# Patient Record
Sex: FEMALE | Race: WHITE | Employment: PART TIME | URBAN - METROPOLITAN AREA
[De-identification: names, ages, dates, MRNs, and addresses within clinical notes are randomized per-mention and may not be internally consistent; named-entity substitution may affect disease eponyms.]

---

## 2021-12-03 ENCOUNTER — TELEPHONE (OUTPATIENT)
Dept: ORTHOPEDIC SURGERY | Age: 51
End: 2021-12-03

## 2021-12-03 DIAGNOSIS — S52.532A CLOSED COLLES' FRACTURE OF LEFT RADIUS, INITIAL ENCOUNTER: Primary | ICD-10-CM

## 2021-12-03 NOTE — TELEPHONE ENCOUNTER
Spoke to Markus Wallace regarding her injury. In summary, 23-year-old right-hand-dominant female slipped on a wet embankment earlier this morning and landed onto an outstretched right hand. She sustained immediate deformity and inability to use her right wrist. She was seen at [facility]. I was contacted regarding her case. I recommended operative intervention given significant dorsal angulation and shortening of her radius. She was placed into a well-padded sugar tong splint. She denies dysesthesias. After discussion, the patient elected to proceed with operative intervention. She would prefer to have this performed closer to her home at [facility]. Thursday, December 9 was offered as a surgery date.

## 2021-12-07 ENCOUNTER — ANESTHESIA EVENT (OUTPATIENT)
Dept: OPERATING ROOM | Age: 51
End: 2021-12-07
Payer: COMMERCIAL

## 2021-12-07 RX ORDER — M-VIT,TX,IRON,MINS/CALC/FOLIC 27MG-0.4MG
1 TABLET ORAL DAILY
COMMUNITY

## 2021-12-07 RX ORDER — SODIUM CHLORIDE 0.9 % (FLUSH) 0.9 %
5-40 SYRINGE (ML) INJECTION EVERY 12 HOURS SCHEDULED
Status: CANCELLED | OUTPATIENT
Start: 2021-12-07

## 2021-12-07 RX ORDER — SODIUM CHLORIDE 9 MG/ML
25 INJECTION, SOLUTION INTRAVENOUS PRN
Status: CANCELLED | OUTPATIENT
Start: 2021-12-07

## 2021-12-07 RX ORDER — SODIUM CHLORIDE 0.9 % (FLUSH) 0.9 %
5-40 SYRINGE (ML) INJECTION PRN
Status: CANCELLED | OUTPATIENT
Start: 2021-12-07

## 2021-12-07 NOTE — PROGRESS NOTES

## 2021-12-08 ENCOUNTER — TELEPHONE (OUTPATIENT)
Dept: ORTHOPEDIC SURGERY | Age: 51
End: 2021-12-08

## 2021-12-08 NOTE — TELEPHONE ENCOUNTER
Called [facility] for pre cert for outpatient surgery scheduled tomorrow with Dr. Hiram Lopez at [facility].     Patient is have a right radius ORIF [id number]    No pre cert Required for this CPT.    Reference # [id number]

## 2021-12-09 ENCOUNTER — APPOINTMENT (OUTPATIENT)
Dept: GENERAL RADIOLOGY | Age: 51
End: 2021-12-09
Attending: ORTHOPAEDIC SURGERY
Payer: COMMERCIAL

## 2021-12-09 ENCOUNTER — ANESTHESIA (OUTPATIENT)
Dept: OPERATING ROOM | Age: 51
End: 2021-12-09
Payer: COMMERCIAL

## 2021-12-09 ENCOUNTER — HOSPITAL ENCOUNTER (OUTPATIENT)
Age: 51
Setting detail: OUTPATIENT SURGERY
Discharge: HOME | End: 2021-12-09
Attending: ORTHOPAEDIC SURGERY | Admitting: ORTHOPAEDIC SURGERY
Payer: COMMERCIAL

## 2021-12-09 VITALS
HEART RATE: 84 BPM | RESPIRATION RATE: 20 BRPM | SYSTOLIC BLOOD PRESSURE: 116 MMHG | BODY MASS INDEX: 25.49 KG/M2 | WEIGHT: 135 LBS | TEMPERATURE: 98 F | DIASTOLIC BLOOD PRESSURE: 89 MMHG | OXYGEN SATURATION: 96 % | HEIGHT: 61 IN

## 2021-12-09 VITALS — SYSTOLIC BLOOD PRESSURE: 100 MMHG | TEMPERATURE: 96.4 F | OXYGEN SATURATION: 100 % | DIASTOLIC BLOOD PRESSURE: 51 MMHG

## 2021-12-09 DIAGNOSIS — S52.532A CLOSED COLLES' FRACTURE OF LEFT RADIUS, INITIAL ENCOUNTER: Primary | ICD-10-CM

## 2021-12-09 LAB
ANION GAP SERPL CALCULATED.3IONS-SCNC: 12 MMOL/L (ref 3–16)
BUN BLDV-MCNC: 10 MG/DL (ref 7–20)
CALCIUM SERPL-MCNC: 9.3 MG/DL (ref 8.3–10.6)
CHLORIDE BLD-SCNC: 99 MMOL/L (ref 99–110)
CO2: 22 MMOL/L (ref 21–32)
CREAT SERPL-MCNC: <0.5 MG/DL (ref 0.6–1.1)
EKG ATRIAL RATE: 75 BPM
EKG DIAGNOSIS: NORMAL
EKG P AXIS: 60 DEGREES
EKG P-R INTERVAL: 140 MS
EKG Q-T INTERVAL: 386 MS
EKG QRS DURATION: 74 MS
EKG QTC CALCULATION (BAZETT): 431 MS
EKG R AXIS: 61 DEGREES
EKG T AXIS: 36 DEGREES
EKG VENTRICULAR RATE: 75 BPM
GFR AFRICAN AMERICAN: >60
GFR NON-AFRICAN AMERICAN: >60
GLUCOSE BLD-MCNC: 115 MG/DL (ref 70–99)
HCT VFR BLD CALC: 39.9 % (ref 36–48)
HEMOGLOBIN: 13.5 G/DL (ref 12–16)
INR BLD: 0.87 (ref 0.88–1.12)
MCH RBC QN AUTO: 31.6 PG (ref 26–34)
MCHC RBC AUTO-ENTMCNC: 33.7 G/DL (ref 31–36)
MCV RBC AUTO: 93.6 FL (ref 80–100)
PDW BLD-RTO: 12.8 % (ref 12.4–15.4)
PLATELET # BLD: 318 K/UL (ref 135–450)
PMV BLD AUTO: 8.5 FL (ref 5–10.5)
POTASSIUM REFLEX MAGNESIUM: 4.5 MMOL/L (ref 3.5–5.1)
PROTHROMBIN TIME: 9.8 SEC (ref 9.9–12.7)
RBC # BLD: 4.27 M/UL (ref 4–5.2)
SODIUM BLD-SCNC: 133 MMOL/L (ref 136–145)
WBC # BLD: 7.3 K/UL (ref 4–11)

## 2021-12-09 PROCEDURE — 85610 PROTHROMBIN TIME: CPT

## 2021-12-09 PROCEDURE — 7100000000 HC PACU RECOVERY - FIRST 15 MIN: Performed by: ORTHOPAEDIC SURGERY

## 2021-12-09 PROCEDURE — 3700000001 HC ADD 15 MINUTES (ANESTHESIA): Performed by: ORTHOPAEDIC SURGERY

## 2021-12-09 PROCEDURE — 6360000002 HC RX W HCPCS: Performed by: ANESTHESIOLOGY

## 2021-12-09 PROCEDURE — 64415 NJX AA&/STRD BRCH PLXS IMG: CPT | Performed by: ANESTHESIOLOGY

## 2021-12-09 PROCEDURE — 36415 COLL VENOUS BLD VENIPUNCTURE: CPT

## 2021-12-09 PROCEDURE — 93005 ELECTROCARDIOGRAM TRACING: CPT | Performed by: ORTHOPAEDIC SURGERY

## 2021-12-09 PROCEDURE — 2709999900 HC NON-CHARGEABLE SUPPLY: Performed by: ORTHOPAEDIC SURGERY

## 2021-12-09 PROCEDURE — 3700000000 HC ANESTHESIA ATTENDED CARE: Performed by: ORTHOPAEDIC SURGERY

## 2021-12-09 PROCEDURE — 3600000014 HC SURGERY LEVEL 4 ADDTL 15MIN: Performed by: ORTHOPAEDIC SURGERY

## 2021-12-09 PROCEDURE — 93010 ELECTROCARDIOGRAM REPORT: CPT | Performed by: INTERNAL MEDICINE

## 2021-12-09 PROCEDURE — 85027 COMPLETE CBC AUTOMATED: CPT

## 2021-12-09 PROCEDURE — 25608 OPTX DST RD XART FX/EPI SEP2: CPT | Performed by: ORTHOPAEDIC SURGERY

## 2021-12-09 PROCEDURE — 2720000010 HC SURG SUPPLY STERILE: Performed by: ORTHOPAEDIC SURGERY

## 2021-12-09 PROCEDURE — 2580000003 HC RX 258: Performed by: ORTHOPAEDIC SURGERY

## 2021-12-09 PROCEDURE — 2580000003 HC RX 258: Performed by: NURSE ANESTHETIST, CERTIFIED REGISTERED

## 2021-12-09 PROCEDURE — 7100000011 HC PHASE II RECOVERY - ADDTL 15 MIN: Performed by: ORTHOPAEDIC SURGERY

## 2021-12-09 PROCEDURE — C1713 ANCHOR/SCREW BN/BN,TIS/BN: HCPCS | Performed by: ORTHOPAEDIC SURGERY

## 2021-12-09 PROCEDURE — 6360000002 HC RX W HCPCS: Performed by: ORTHOPAEDIC SURGERY

## 2021-12-09 PROCEDURE — 7100000010 HC PHASE II RECOVERY - FIRST 15 MIN: Performed by: ORTHOPAEDIC SURGERY

## 2021-12-09 PROCEDURE — 80048 BASIC METABOLIC PNL TOTAL CA: CPT

## 2021-12-09 PROCEDURE — 3600000004 HC SURGERY LEVEL 4 BASE: Performed by: ORTHOPAEDIC SURGERY

## 2021-12-09 PROCEDURE — 6360000002 HC RX W HCPCS: Performed by: NURSE ANESTHETIST, CERTIFIED REGISTERED

## 2021-12-09 PROCEDURE — 2580000003 HC RX 258: Performed by: ANESTHESIOLOGY

## 2021-12-09 PROCEDURE — 3209999900 FLUORO FOR SURGICAL PROCEDURES

## 2021-12-09 PROCEDURE — 7100000001 HC PACU RECOVERY - ADDTL 15 MIN: Performed by: ORTHOPAEDIC SURGERY

## 2021-12-09 PROCEDURE — 2500000003 HC RX 250 WO HCPCS: Performed by: NURSE ANESTHETIST, CERTIFIED REGISTERED

## 2021-12-09 PROCEDURE — 2500000003 HC RX 250 WO HCPCS: Performed by: ANESTHESIOLOGY

## 2021-12-09 DEVICE — VLP TITANIUM 2.4MM X 16MM LOCKING                                    SCREW T7 SELF-TAPPING
Type: IMPLANTABLE DEVICE | Site: RADIUS | Status: FUNCTIONAL
Brand: VLP MINI-MOD

## 2021-12-09 DEVICE — VLP TITANIUM 2.4MM X 20MM LOCKING                                    SCREW T7 SELF-TAPPING
Type: IMPLANTABLE DEVICE | Site: RADIUS | Status: FUNCTIONAL
Brand: VLP MINI-MOD

## 2021-12-09 DEVICE — VLP TITANIUM 2.4MM X 13MM CORTEX                                    SCREW T7 SELF-TAPPING
Type: IMPLANTABLE DEVICE | Site: RADIUS | Status: FUNCTIONAL
Brand: VLP MINI-MOD

## 2021-12-09 DEVICE — VLP TITANIUM 2.4MM X 12MM CORTEX                                    SCREW T7 SELF-TAPPING
Type: IMPLANTABLE DEVICE | Site: RADIUS | Status: FUNCTIONAL
Brand: VLP MINI-MOD

## 2021-12-09 DEVICE — VLP TITANIUM 2.4MM X 21MM LOCKING                                    SCREW T7 SELF-TAPPING
Type: IMPLANTABLE DEVICE | Site: RADIUS | Status: FUNCTIONAL
Brand: VLP MINI-MOD

## 2021-12-09 RX ORDER — BUPIVACAINE HYDROCHLORIDE 5 MG/ML
INJECTION, SOLUTION EPIDURAL; INTRACAUDAL PRN
Status: DISCONTINUED | OUTPATIENT
Start: 2021-12-09 | End: 2021-12-09 | Stop reason: SDUPTHER

## 2021-12-09 RX ORDER — LABETALOL HYDROCHLORIDE 5 MG/ML
5 INJECTION, SOLUTION INTRAVENOUS EVERY 10 MIN PRN
Status: DISCONTINUED | OUTPATIENT
Start: 2021-12-09 | End: 2021-12-09 | Stop reason: HOSPADM

## 2021-12-09 RX ORDER — MAGNESIUM HYDROXIDE 1200 MG/15ML
LIQUID ORAL CONTINUOUS PRN
Status: COMPLETED | OUTPATIENT
Start: 2021-12-09 | End: 2021-12-09

## 2021-12-09 RX ORDER — HYDRALAZINE HYDROCHLORIDE 20 MG/ML
5 INJECTION INTRAMUSCULAR; INTRAVENOUS EVERY 10 MIN PRN
Status: DISCONTINUED | OUTPATIENT
Start: 2021-12-09 | End: 2021-12-09 | Stop reason: HOSPADM

## 2021-12-09 RX ORDER — OXYCODONE HYDROCHLORIDE AND ACETAMINOPHEN 5; 325 MG/1; MG/1
1 TABLET ORAL PRN
Status: DISCONTINUED | OUTPATIENT
Start: 2021-12-09 | End: 2021-12-09 | Stop reason: HOSPADM

## 2021-12-09 RX ORDER — MORPHINE SULFATE 2 MG/ML
1 INJECTION, SOLUTION INTRAMUSCULAR; INTRAVENOUS EVERY 5 MIN PRN
Status: DISCONTINUED | OUTPATIENT
Start: 2021-12-09 | End: 2021-12-09 | Stop reason: HOSPADM

## 2021-12-09 RX ORDER — SODIUM CHLORIDE 0.9 % (FLUSH) 0.9 %
10 SYRINGE (ML) INJECTION PRN
Status: DISCONTINUED | OUTPATIENT
Start: 2021-12-09 | End: 2021-12-09 | Stop reason: HOSPADM

## 2021-12-09 RX ORDER — FENTANYL CITRATE 50 UG/ML
INJECTION, SOLUTION INTRAMUSCULAR; INTRAVENOUS PRN
Status: DISCONTINUED | OUTPATIENT
Start: 2021-12-09 | End: 2021-12-09 | Stop reason: SDUPTHER

## 2021-12-09 RX ORDER — APREPITANT 40 MG/1
40 CAPSULE ORAL ONCE
Status: COMPLETED | OUTPATIENT
Start: 2021-12-09 | End: 2021-12-09

## 2021-12-09 RX ORDER — PROMETHAZINE HYDROCHLORIDE 25 MG/ML
6.25 INJECTION, SOLUTION INTRAMUSCULAR; INTRAVENOUS
Status: DISCONTINUED | OUTPATIENT
Start: 2021-12-09 | End: 2021-12-09 | Stop reason: HOSPADM

## 2021-12-09 RX ORDER — MEPERIDINE HYDROCHLORIDE 25 MG/ML
12.5 INJECTION INTRAMUSCULAR; INTRAVENOUS; SUBCUTANEOUS EVERY 5 MIN PRN
Status: DISCONTINUED | OUTPATIENT
Start: 2021-12-09 | End: 2021-12-09 | Stop reason: HOSPADM

## 2021-12-09 RX ORDER — POLYETHYLENE GLYCOL 3350 17 G/17G
17 POWDER, FOR SOLUTION ORAL DAILY PRN
Status: DISCONTINUED | OUTPATIENT
Start: 2021-12-09 | End: 2021-12-09 | Stop reason: HOSPADM

## 2021-12-09 RX ORDER — ONDANSETRON 4 MG/1
4 TABLET, ORALLY DISINTEGRATING ORAL EVERY 8 HOURS PRN
Status: DISCONTINUED | OUTPATIENT
Start: 2021-12-09 | End: 2021-12-09 | Stop reason: HOSPADM

## 2021-12-09 RX ORDER — LIDOCAINE HYDROCHLORIDE 10 MG/ML
1 INJECTION, SOLUTION EPIDURAL; INFILTRATION; INTRACAUDAL; PERINEURAL
Status: DISCONTINUED | OUTPATIENT
Start: 2021-12-09 | End: 2021-12-09 | Stop reason: HOSPADM

## 2021-12-09 RX ORDER — ONDANSETRON 2 MG/ML
4 INJECTION INTRAMUSCULAR; INTRAVENOUS
Status: DISCONTINUED | OUTPATIENT
Start: 2021-12-09 | End: 2021-12-09 | Stop reason: HOSPADM

## 2021-12-09 RX ORDER — MIDAZOLAM HYDROCHLORIDE 1 MG/ML
INJECTION INTRAMUSCULAR; INTRAVENOUS PRN
Status: DISCONTINUED | OUTPATIENT
Start: 2021-12-09 | End: 2021-12-09 | Stop reason: SDUPTHER

## 2021-12-09 RX ORDER — PROPOFOL 10 MG/ML
INJECTION, EMULSION INTRAVENOUS PRN
Status: DISCONTINUED | OUTPATIENT
Start: 2021-12-09 | End: 2021-12-09 | Stop reason: SDUPTHER

## 2021-12-09 RX ORDER — ONDANSETRON 2 MG/ML
4 INJECTION INTRAMUSCULAR; INTRAVENOUS EVERY 6 HOURS PRN
Status: DISCONTINUED | OUTPATIENT
Start: 2021-12-09 | End: 2021-12-09 | Stop reason: HOSPADM

## 2021-12-09 RX ORDER — MIDAZOLAM HYDROCHLORIDE 1 MG/ML
INJECTION INTRAMUSCULAR; INTRAVENOUS
Status: COMPLETED
Start: 2021-12-09 | End: 2021-12-09

## 2021-12-09 RX ORDER — ONDANSETRON 2 MG/ML
INJECTION INTRAMUSCULAR; INTRAVENOUS PRN
Status: DISCONTINUED | OUTPATIENT
Start: 2021-12-09 | End: 2021-12-09 | Stop reason: SDUPTHER

## 2021-12-09 RX ORDER — SODIUM CHLORIDE 9 MG/ML
25 INJECTION, SOLUTION INTRAVENOUS PRN
Status: DISCONTINUED | OUTPATIENT
Start: 2021-12-09 | End: 2021-12-09 | Stop reason: HOSPADM

## 2021-12-09 RX ORDER — SODIUM CHLORIDE, SODIUM LACTATE, POTASSIUM CHLORIDE, CALCIUM CHLORIDE 600; 310; 30; 20 MG/100ML; MG/100ML; MG/100ML; MG/100ML
INJECTION, SOLUTION INTRAVENOUS CONTINUOUS PRN
Status: DISCONTINUED | OUTPATIENT
Start: 2021-12-09 | End: 2021-12-09 | Stop reason: SDUPTHER

## 2021-12-09 RX ORDER — OXYCODONE HYDROCHLORIDE AND ACETAMINOPHEN 5; 325 MG/1; MG/1
1 TABLET ORAL EVERY 4 HOURS PRN
Qty: 30 TABLET | Refills: 0 | Status: SHIPPED | OUTPATIENT
Start: 2021-12-09 | End: 2021-12-14

## 2021-12-09 RX ORDER — SODIUM CHLORIDE 0.9 % (FLUSH) 0.9 %
10 SYRINGE (ML) INJECTION EVERY 12 HOURS SCHEDULED
Status: DISCONTINUED | OUTPATIENT
Start: 2021-12-09 | End: 2021-12-09 | Stop reason: HOSPADM

## 2021-12-09 RX ORDER — MORPHINE SULFATE 2 MG/ML
2 INJECTION, SOLUTION INTRAMUSCULAR; INTRAVENOUS EVERY 5 MIN PRN
Status: DISCONTINUED | OUTPATIENT
Start: 2021-12-09 | End: 2021-12-09 | Stop reason: HOSPADM

## 2021-12-09 RX ORDER — DIPHENHYDRAMINE HYDROCHLORIDE 50 MG/ML
12.5 INJECTION INTRAMUSCULAR; INTRAVENOUS
Status: DISCONTINUED | OUTPATIENT
Start: 2021-12-09 | End: 2021-12-09 | Stop reason: HOSPADM

## 2021-12-09 RX ORDER — SODIUM CHLORIDE, SODIUM LACTATE, POTASSIUM CHLORIDE, CALCIUM CHLORIDE 600; 310; 30; 20 MG/100ML; MG/100ML; MG/100ML; MG/100ML
INJECTION, SOLUTION INTRAVENOUS CONTINUOUS
Status: DISCONTINUED | OUTPATIENT
Start: 2021-12-09 | End: 2021-12-09 | Stop reason: HOSPADM

## 2021-12-09 RX ORDER — LIDOCAINE HYDROCHLORIDE 20 MG/ML
INJECTION, SOLUTION INFILTRATION; PERINEURAL PRN
Status: DISCONTINUED | OUTPATIENT
Start: 2021-12-09 | End: 2021-12-09 | Stop reason: SDUPTHER

## 2021-12-09 RX ORDER — OXYCODONE HYDROCHLORIDE AND ACETAMINOPHEN 5; 325 MG/1; MG/1
2 TABLET ORAL PRN
Status: DISCONTINUED | OUTPATIENT
Start: 2021-12-09 | End: 2021-12-09 | Stop reason: HOSPADM

## 2021-12-09 RX ADMIN — SODIUM CHLORIDE, POTASSIUM CHLORIDE, SODIUM LACTATE AND CALCIUM CHLORIDE: 600; 310; 30; 20 INJECTION, SOLUTION INTRAVENOUS at 10:33

## 2021-12-09 RX ADMIN — BUPIVACAINE HYDROCHLORIDE 30 ML: 5 INJECTION, SOLUTION EPIDURAL; INTRACAUDAL; PERINEURAL at 09:44

## 2021-12-09 RX ADMIN — Medication 2000 MG: at 10:30

## 2021-12-09 RX ADMIN — ONDANSETRON HYDROCHLORIDE 4 MG: 2 INJECTION, SOLUTION INTRAMUSCULAR; INTRAVENOUS at 10:36

## 2021-12-09 RX ADMIN — MIDAZOLAM 4 MG: 1 INJECTION INTRAMUSCULAR; INTRAVENOUS at 09:44

## 2021-12-09 RX ADMIN — FENTANYL CITRATE 50 MCG: 50 INJECTION INTRAMUSCULAR; INTRAVENOUS at 10:52

## 2021-12-09 RX ADMIN — PROPOFOL 200 MG: 10 INJECTION, EMULSION INTRAVENOUS at 10:36

## 2021-12-09 RX ADMIN — LIDOCAINE HYDROCHLORIDE 100 MG: 20 INJECTION, SOLUTION INFILTRATION; PERINEURAL at 10:36

## 2021-12-09 RX ADMIN — APREPITANT 40 MG: 40 CAPSULE ORAL at 10:06

## 2021-12-09 RX ADMIN — SODIUM CHLORIDE, POTASSIUM CHLORIDE, SODIUM LACTATE AND CALCIUM CHLORIDE: 600; 310; 30; 20 INJECTION, SOLUTION INTRAVENOUS at 08:36

## 2021-12-09 RX ADMIN — FENTANYL CITRATE 50 MCG: 50 INJECTION INTRAMUSCULAR; INTRAVENOUS at 10:36

## 2021-12-09 ASSESSMENT — PULMONARY FUNCTION TESTS
PIF_VALUE: 3
PIF_VALUE: 2
PIF_VALUE: 2
PIF_VALUE: 3
PIF_VALUE: 3
PIF_VALUE: 2
PIF_VALUE: 3
PIF_VALUE: 3
PIF_VALUE: 2
PIF_VALUE: 2
PIF_VALUE: 3
PIF_VALUE: 3
PIF_VALUE: 2
PIF_VALUE: 2
PIF_VALUE: 3
PIF_VALUE: 2
PIF_VALUE: 3
PIF_VALUE: 2
PIF_VALUE: 0
PIF_VALUE: 2
PIF_VALUE: 3
PIF_VALUE: 1
PIF_VALUE: 2
PIF_VALUE: 4
PIF_VALUE: 2
PIF_VALUE: 3
PIF_VALUE: 2
PIF_VALUE: 3
PIF_VALUE: 2
PIF_VALUE: 1
PIF_VALUE: 3
PIF_VALUE: 2
PIF_VALUE: 3
PIF_VALUE: 2
PIF_VALUE: 2
PIF_VALUE: 3
PIF_VALUE: 2
PIF_VALUE: 3
PIF_VALUE: 4
PIF_VALUE: 3
PIF_VALUE: 3
PIF_VALUE: 4
PIF_VALUE: 3
PIF_VALUE: 2
PIF_VALUE: 3
PIF_VALUE: 3
PIF_VALUE: 2
PIF_VALUE: 3
PIF_VALUE: 2
PIF_VALUE: 3
PIF_VALUE: 2
PIF_VALUE: 2
PIF_VALUE: 3
PIF_VALUE: 3
PIF_VALUE: 2
PIF_VALUE: 3
PIF_VALUE: 2
PIF_VALUE: 3
PIF_VALUE: 2
PIF_VALUE: 3
PIF_VALUE: 3
PIF_VALUE: 2
PIF_VALUE: 2
PIF_VALUE: 3
PIF_VALUE: 3
PIF_VALUE: 2
PIF_VALUE: 3
PIF_VALUE: 3
PIF_VALUE: 2
PIF_VALUE: 3
PIF_VALUE: 3
PIF_VALUE: 2
PIF_VALUE: 2
PIF_VALUE: 0
PIF_VALUE: 3
PIF_VALUE: 3
PIF_VALUE: 2
PIF_VALUE: 3
PIF_VALUE: 4
PIF_VALUE: 2

## 2021-12-09 ASSESSMENT — PAIN - FUNCTIONAL ASSESSMENT: PAIN_FUNCTIONAL_ASSESSMENT: 0-10

## 2021-12-09 ASSESSMENT — LIFESTYLE VARIABLES: SMOKING_STATUS: 1

## 2021-12-09 NOTE — ANESTHESIA PRE PROCEDURE
Department of Anesthesiology  Preprocedure Note       Name:  Oscar Govea   Age:  46 y.o.  :  1970                                          MRN:  [id number]         Date:  2021      Surgeon: Jessie Mohs):  Fredi Arechiga MD    Procedure: Procedure(s):  RIGHT RADIUS OPEN REDUCTION INTERNAL FIXATION *WITH BLOCK*    Medications prior to admission:   Prior to Admission medications    Medication Sig Start Date End Date Taking?  Authorizing Provider   Multiple Vitamins-Minerals (THERAPEUTIC MULTIVITAMIN-MINERALS) tablet Take 1 tablet by mouth daily   Yes Historical Provider, MD   aspirin 81 MG tablet Take 81 mg by mouth daily   Yes Historical Provider, MD       Current medications:    Current Facility-Administered Medications   Medication Dose Route Frequency Provider Last Rate Last Admin    ondansetron (ZOFRAN-ODT) disintegrating tablet 4 mg  4 mg Oral Q8H PRN Fredi Arechiga MD        Or    ondansetron [facility]) injection 4 mg  4 mg IntraVENous Q6H PRN Fredi Arechiga MD        polyethylene glycol [facility]) packet 17 g  17 g Oral Daily PRN Fredi Arechiga MD        ceFAZolin (ANCEF) 2000 mg in sterile water 20 mL IV syringe  2,000 mg IntraVENous On Call to [address] [place] [address] MD        lactated ringers infusion   IntraVENous Continuous Cassie Dhillon  mL/hr at 21 0836 New Bag at 21 0836    sodium chloride flush 0.9 % injection 10 mL  10 mL IntraVENous 2 times per day Cassie Dhillon MD        sodium chloride flush 0.9 % injection 10 mL  10 mL IntraVENous PRN Cassie Dhillon MD        0.9 % sodium chloride infusion  25 mL IntraVENous PRN Cassie Dhillon MD        lidocaine PF 1 % injection 1 mL  1 mL IntraDERmal Once PRN Cassie Dhillon MD        aprepitant (EMEND) capsule 40 mg  40 mg Oral Once Cassie Dhillon MD           Allergies:  No Known Allergies    Problem List:    Patient Active Problem List   Diagnosis Code    Nondisplaced fracture of anterior process of calcaneus S92.026A    Fracture, Colles, left, closed S52.532A       Past Medical History:        Diagnosis Date    Fractures        Past Surgical History:  History reviewed. No pertinent surgical history. Social History:    Social History     Tobacco Use    Smoking status: Current Some Day Smoker     Types: Cigarettes    Smokeless tobacco: Never Used   Substance Use Topics    Alcohol use: Yes     Alcohol/week: 0.0 standard drinks                                Ready to quit: Not Answered  Counseling given: Not Answered      Vital Signs (Current):   Vitals:    12/07/21 1052 12/09/21 0813   BP:  (!) 148/80   Pulse:  78   Resp:  16   Temp:  97.7 °F (36.5 °C)   TempSrc:  Infrared   SpO2:  97%   Weight: 135 lb (61.2 kg) 135 lb (61.2 kg)   Height: 5' 1\" (1.549 m) 5' 1\" (1.549 m)                                              BP Readings from Last 3 Encounters:   12/09/21 (!) 148/80   07/06/15 140/90       NPO Status: Time of last liquid consumption: 0000                        Time of last solid consumption: 0000                        Date of last liquid consumption: 12/08/21                        Date of last solid food consumption: 12/08/21    BMI:   Wt Readings from Last 3 Encounters:   12/09/21 135 lb (61.2 kg)   07/06/15 130 lb (59 kg)   06/05/15 135 lb (61.2 kg)     Body mass index is 25.51 kg/m². CBC: No results found for: WBC, RBC, HGB, HCT, MCV, RDW, PLT    CMP: No results found for: NA, K, CL, CO2, BUN, CREATININE, GFRAA, AGRATIO, LABGLOM, GLUCOSE, PROT, CALCIUM, BILITOT, ALKPHOS, AST, ALT    POC Tests: No results for input(s): POCGLU, POCNA, POCK, POCCL, POCBUN, POCHEMO, POCHCT in the last 72 hours.     Coags: No results found for: PROTIME, INR, APTT    HCG (If Applicable): No results found for: PREGTESTUR, PREGSERUM, HCG, HCGQUANT     ABGs: No results found for: PHART, PO2ART, YAR6JVP, WLH1QZC, BEART, E9VKITWO     Type & Screen (If Applicable):  No results found for: LABABO, LABRH    Drug/Infectious Status (If Applicable):  No results found for: HIV, HEPCAB    COVID-19 Screening (If Applicable): No results found for: COVID19        Anesthesia Evaluation   no history of anesthetic complications:   Airway: Mallampati: II  TM distance: >3 FB   Neck ROM: full  Mouth opening: > = 3 FB Dental:          Pulmonary:   (+) current smoker                           Cardiovascular:Negative CV ROS                      Neuro/Psych:   Negative Neuro/Psych ROS              GI/Hepatic/Renal: Neg GI/Hepatic/Renal ROS            Endo/Other: Negative Endo/Other ROS                    Abdominal:             Vascular: negative vascular ROS. Other Findings:             Anesthesia Plan      general     ASA 2     (Pt agrees to risks,benefits and alternatives to GA as well as an ultrasound guided supraclavicular nerve block for post operative analgesia.)  Induction: intravenous. Anesthetic plan and risks discussed with patient.                       Colette Mitchell MD   12/9/2021

## 2021-12-09 NOTE — OP NOTE
Operative Note      Patient: Delmy Corey  YOB: 1970  MRN: [id number]    Date of Procedure: 12/9/2021    Pre-Op Diagnosis: right displaced distal radius fracture    Post-Op Diagnosis: Same       Procedure(s):  RIGHT RADIUS OPEN REDUCTION INTERNAL FIXATION    Surgeon(s):  Javier Muniz MD    Assistant:   Surgical Assistant: Duane Bow    Anesthesia: General    Estimated Blood Loss (mL): less than 50     Complications: None    Specimens:   * No specimens in log *    Implants:  Implant Name Type Inv. Item Serial No.  Lot No. LRB No. Used Action   SCREW BONE L12MM OD2. 4MM T7 CRTX ST TI VLP  SCREW BONE L12MM OD2. 4MM T7 CRTX ST TI VLP  [employer] [facility]  Right 2 Implanted   SCREW BONE L13MM OD2. 4MM T7 CRTX ST TI VLP  SCREW BONE L13MM OD2. 4MM T7 CRTX ST TI VLP  [employer] [facility]  Right 1 Implanted   SCREW BONE L16MM OD2. 4MM T7 LCK ST TI VLP  SCREW BONE L16MM OD2. 4MM T7 LCK ST TI VLP  [employer] [facility]  Right 1 Implanted   SCREW BONE L20MM DIA2.4MM WR TI ST LCK T7 FOR VLP PLT  SCREW BONE L20MM DIA2.4MM WR TI ST LCK T7 FOR VLP PLT  [employer] [facility]  Right 2 Implanted   SCREW BONE L21MM OD2. 4MM T7 LCK ST TI VLP  SCREW BONE L21MM OD2. 4MM T7 LCK ST TI VLP  [employer] [facility]  Right 2 Implanted   SCREW BONE L22MM DIA2.4MM WR TI ST LCK T7 FOR VLP PLT  SCREW BONE L22MM DIA2.4MM WR TI ST LCK T7 FOR VLP PLT  [employer] [facility]  Right 2 Implanted   Tourniquet time: 1 hour @250mmHg      Drains: * No LDAs found *    Findings: displaced distal radius fracture with intraarticular extension/separate styloid fragment. Detailed Description of Procedure:   Patient was positively identified in the preoperative holding area by the attending surgeon. Informed consent was verified and placed on the chart. The right upper extremity was marked appropriately.   The patient is then taken to the operating room by the anesthesia team.  An upper extremity block was performed for postoperative pain control. A general anesthesia was induced. She was laid supine with all bony prominences well-padded. A nonsterile tourniquet was placed high on the right upper extremity but not yet inflated. The limb was examined. There is diffuse ecchymosis about the volar wrist.  No cuts, wounds, or abrasions. There was obvious deformity with extension of the wrist.  At this point, the right upper extremity was prepped and draped in standard sterile fashion. A timeout was held to verify the correct patient, operative site, laterality, and procedure. Everyone in the room was in agreement. Next, the limb was exsanguinated via an Esmarch and the tourniquet was inflated. A standard FCR approach to the distal radius was utilized. A longitudinal incision was made over the palpable FCR tendon. Dissection carried sharply through the skin and subcutaneous tissues. A scalpel was used to incise the tendon sheath overlying the FCR tendon and this was retracted ulnarly to protect the median nerve. Surface of the flexor tendon sheath was incised. The FPL muscle belly was identified. This was also retracted ulnarly. The radial border of the pronator quadratus was incised sharply. An elevator was used to sweep the pronator ulnarly. The fracture site was identified. There is noted to be separate radial styloid piece. There is also noted to the a very distal fracture line. Fracture site was accentuated and a freer elevator was placed into the fracture site. The provisional reduction was achieved. At this point, a heavy K wire was placed across the fracture site from the radial styloid. C-arm fluoroscopy was brought into evaluate this in AP and lateral projections. Overall reasonable reduction achieved, near anatomic. At this point, a 3-hole distal radius locking plate was selected reconstructed. The standard width plate was selected.   This was then provisionally pinned in place using K wires. Attempt was made to keep this of low prominence at the watershed line/volar rim. A bicortical screw was placed in the oblong hole of the plate. Next, the fixed angle guide was used to place screws into the distal radius fragment. These were noted to be fairly horizontal and did not have to come to the articular surface as it was hoped. The screws were backed out and repositioned using the variable angle provided in the more distal trajectory. These were drilled on the lateral view with fluoroscopy. Being satisfied with this, additional screws were placed into the radial styloid and the lunate facet fragment. 2 additional bicortical screws were placed in the radial shaft to complete the fixation. C-arm fluoroscopy evaluated the reduction, hardware placement on AP, lateral, and 30 degree inclined x-ray. Satisfied with this, the wound was copiously irrigated with sterile saline solution. The tourniquet was let down. Hemostasis was achieved. The wound was then closed in layered fashion using 3-0 Vicryl and 3-0 nylon in an interrupted fashion. A sterile dressing was applied. A short arm volar splint was applied. The patient was then awakened from general anesthesia and transitioned back to the hospital bed. She was taken to the postoperative recovery area in apparent stable condition. There were no immediate complications. All sponge and needle counts were correct.     Electronically signed by Margretta Fabry, MD on 12/9/2021 at 12:52 PM

## 2021-12-09 NOTE — INTERVAL H&P NOTE
Update History & Physical    The patient's History and Physical of December 8, 2021 was reviewed with the patient and I examined the patient. There was no change. The surgical site was confirmed by the patient and me. Plan: The risks, benefits, expected outcome, and alternative to the recommended procedure have been discussed with the patient. Patient understands and wants to proceed with the procedure. Plan ORIF R distal radius today as outpatient.     Electronically signed by Glenda Juárez MD on 12/9/2021 at 12:31 PM

## 2021-12-09 NOTE — ANESTHESIA PROCEDURE NOTES
Peripheral Block    Patient location during procedure: pre-op  Staffing  Performed: anesthesiologist   Anesthesiologist: Daniel Mann MD  Preanesthetic Checklist  Completed: patient identified, IV checked, site marked, risks and benefits discussed, surgical consent, monitors and equipment checked, pre-op evaluation, timeout performed, anesthesia consent given, oxygen available and patient being monitored  Peripheral Block  Patient position: supine  Prep: ChloraPrep  Patient monitoring: continuous pulse ox and IV access  Block type: Brachial plexus  Laterality: right  Injection technique: single-shot  Guidance: ultrasound guided  Local infiltration: lidocaine  Infiltration strength: 1 %  Dose: 3 mL  Supraclavicular  Provider prep: mask and sterile gloves  Local infiltration: lidocaine  Needle  Needle type: combined needle/nerve stimulator   Needle gauge: 22 G  Needle length: 5 cm  Needle localization: ultrasound guidance  Assessment  Injection assessment: negative aspiration for heme, no paresthesia on injection and local visualized surrounding nerve on ultrasound  Paresthesia pain: none  Slow fractionated injection: yes  Hemodynamics: stable  Additional Notes  Immediately prior to procedure a \"time 2ut\" was called to verify the correct patient, allergies, laterality, procedure and equipment. Time out performed with Refugio Palomino RN    Local Anesthetic: 0.5 %  Bupivacaine plus epi 1 to 200K  Amount: 30 ml  in 5 ml increments after negative aspiration each time. Anterior scalene and middle scalene muscles, 1st rib and pleura, brachial plexus (upper, middle and lower trunk) and Subclavian artery are identified, the tip of the needle and the spread of the local anesthetic around the brachial plexus are visualized. The Brachial plexus appeared to be anatomically normal and there were no abnormal pathologically findings seen.          Reason for block: post-op pain management and at surgeon's request

## 2021-12-09 NOTE — ANESTHESIA POSTPROCEDURE EVALUATION
Department of Anesthesiology  Postprocedure Note    Patient: Delmy Corey  MRN: [id number]  YOB: 1970  Date of evaluation: 12/9/2021  Time:  3:12 PM     Procedure Summary     Date: 12/09/21 Room / Location: [address]    Anesthesia Start: 1033 Anesthesia Stop: 1219    Procedure: RIGHT RADIUS OPEN REDUCTION INTERNAL FIXATION (Right Arm Lower) Diagnosis:       Closed Colles' fracture of left radius, initial encounter      (right displaced distal radius fracture)    Surgeons: Javier Muniz MD Responsible Provider: Rama Chu MD    Anesthesia Type: general ASA Status: 2          Anesthesia Type: general    Letty Phase I: Letty Score: 10    Letty Phase II: Letty Score: 10    Last vitals: Reviewed and per EMR flowsheets. Anesthesia Post Evaluation    Patient location during evaluation: PACU  Patient participation: complete - patient participated  Level of consciousness: awake and alert  Airway patency: patent  Nausea & Vomiting: no nausea and no vomiting  Complications: no  Cardiovascular status: blood pressure returned to baseline  Respiratory status: acceptable  Hydration status: euvolemic  Comments: VSS on transfer to phase 2 recovery. No anesthetic complications.

## 2021-12-09 NOTE — H&P
ORTHOPAEDIC SURGERY HISTORY AND PHYSICAL    CHIEF COMPLAINT: Right wrist injury    HISTORY OF PRESENT ILLNESS:  57-year-old right-hand-dominant female presents for evaluation 6 days after her injury. She slipped on a wet embankment on 12/3/2021. She had immediate pain and deformity to her wrist.  She presented to [facility]. She was placed into a well-padded splint. She has not had any dysesthesias. Per report, no open wounds. Her pain has improved slightly from the time of her injury. She has been elevating and icing it. 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 Past medical history: None    Allergies: No known drug allergies    Past surgical history: None    Family history: No known bleeding or clotting disorders    Social history: Non-smoker. Right-hand-dominant.     Current Facility-Administered Medications   Medication Dose Route Frequency Provider Last Rate Last Admin    ondansetron (ZOFRAN-ODT) disintegrating tablet 4 mg  4 mg Oral Q8H PRN Man Grullon MD        Or    ondansetron [facility]) injection 4 mg  4 mg IntraVENous Q6H PRN Man Grullon MD        polyethylene glycol [facility]) packet 17 g  17 g Oral Daily PRN Man Grullon MD        ceFAZolin (ANCEF) 2000 mg in sterile water 20 mL IV syringe  2,000 mg IntraVENous On Call to [facility] MD Sylvia        lactated ringers infusion   IntraVENous Continuous Johnnie Greenfield  mL/hr at 12/09/21 0836 New Bag at 12/09/21 0836    sodium chloride flush 0.9 % injection 10 mL  10 mL IntraVENous 2 times per day Johnnie Greenfield MD        sodium chloride flush 0.9 % injection 10 mL  10 mL IntraVENous PRN Johnnie Greenfield MD        0.9 % sodium chloride infusion  25 mL IntraVENous PRN Johnnie Greenfield MD        lidocaine PF 1 % injection 1 mL  1 mL IntraDERmal Once PRN Johnnie Greenfield MD        aprepitant (EMEND) capsule 40 mg  40 mg Oral Once Johnnie Greenfield MD        meperidine (DEMEROL) injection 12.5 mg  12.5 mg IntraVENous Q5 Min PRJAKOB Moser MD        HYDROmorphone (DILAUDID) injection 0.25 mg  0.25 mg IntraVENous Q5 Min PRJAKOB Moser MD        HYDROmorphone (DILAUDID) injection 0.5 mg  0.5 mg IntraVENous Q5 Min PRJAKOB Moser MD        morphine (PF) injection 1 mg  1 mg IntraVENous Q5 Min PRJAKOB Moser MD        morphine (PF) injection 2 mg  2 mg IntraVENous Q5 Min PRN Fletcher Moser MD        oxyCODONE-acetaminophen (PERCOCET) 5-325 MG per tablet 1 tablet  1 tablet Oral PRJAKOB Moser MD        Or    oxyCODONE-acetaminophen (PERCOCET) 5-325 MG per tablet 2 tablet  2 tablet Oral PRJAKOB Moser MD        ondansetron [facility]) injection 4 mg  4 mg IntraVENous Once PRN Fletcher Moser MD        promethazine [facility]) injection 6.25 mg  6.25 mg IntraVENous Once PRN Fletcher Moser MD        diphenhydrAMINE (BENADRYL) injection 12.5 mg  12.5 mg IntraVENous Once PRN Fletcher Moser MD        labetalol (NORMODYNE;TRANDATE) injection 5 mg  5 mg IntraVENous Q10 Min PRJAKOB Moser MD        hydrALAZINE (APRESOLINE) injection 5 mg  5 mg IntraVENous Q10 Min PRN Fletcher Moser MD            History reviewed. No pertinent surgical history. No Known Allergies    Family History   Problem Relation Age of Onset    Diabetes Brother        Social History     Socioeconomic History    Marital status:      Spouse name: Not on file    Number of children: Not on file    Years of education: Not on file    Highest education level: Not on file   Occupational History    Not on file   Tobacco Use    Smoking status: Current Some Day Smoker     Types: Cigarettes    Smokeless tobacco: Never Used   Substance and Sexual Activity    Alcohol use:  Yes     Alcohol/week: 0.0 standard drinks    Drug use: Never    Sexual activity: Not on file clean, dry, and intact. No saturation. Sensation is intact to light touch in radial, median, and ulnar distributions. Motor function is grossly intact. She can wiggle her fingers without difficulty. She can flex and extend her thumb IP joint. There is brisk capillary refill to the fingers. Compartments are soft and compressible. RADIOGRAPHIC EXAM:  Images from [facility] demonstrate dorsally displaced, dorsally angulated distal radius fracture. Appears to be extra-articular. Displacement nearly 100% shaft width. ASSESSEMENT AND PLAN    46 y.o. female with the following orthopaedic surgery problems:    1. Displaced right distal radius fracture    Risks, benefits, and alternatives to surgical intervention were discussed with the patient. Informed consent was obtained. Her surgical site was marked. She will be n.p.o. in preparation for the procedure. Her surgery will be performed under general anesthesia plus or minus a nerve block for pain control. Plan ORIF right distal radius fracture with volar locking plate. I described a standard postoperative course. Patient will be treated as an outpatient and discharged home later today. She will receive prophylactic Ancef by IV. Hold anticoagulants. Will follow-up in clinic 12/17/2021 at [facility].     Radha Gillespie MD

## 2021-12-09 NOTE — PROGRESS NOTES
Discharge instructions given to mother Maria E Rosas verbalized understanding. Patient dressing at this time. Denies any needs. Tolerating oral fluids.

## 2021-12-17 ENCOUNTER — OFFICE VISIT (OUTPATIENT)
Dept: ORTHOPEDIC SURGERY | Age: 51
End: 2021-12-17
Payer: COMMERCIAL

## 2021-12-17 ENCOUNTER — TELEPHONE (OUTPATIENT)
Dept: ORTHOPEDIC SURGERY | Age: 51
End: 2021-12-17

## 2021-12-17 VITALS — WEIGHT: 135 LBS | HEIGHT: 61 IN | BODY MASS INDEX: 25.49 KG/M2

## 2021-12-17 DIAGNOSIS — Z98.890 S/P ORIF (OPEN REDUCTION INTERNAL FIXATION) FRACTURE: ICD-10-CM

## 2021-12-17 DIAGNOSIS — S52.531D CLOSED COLLES' FRACTURE OF RIGHT RADIUS WITH ROUTINE HEALING, SUBSEQUENT ENCOUNTER: Primary | ICD-10-CM

## 2021-12-17 DIAGNOSIS — Z87.81 S/P ORIF (OPEN REDUCTION INTERNAL FIXATION) FRACTURE: ICD-10-CM

## 2021-12-17 PROCEDURE — 99024 POSTOP FOLLOW-UP VISIT: CPT | Performed by: ORTHOPAEDIC SURGERY

## 2021-12-17 PROCEDURE — L3908 WHO COCK-UP NONMOLDE PRE OTS: HCPCS | Performed by: ORTHOPAEDIC SURGERY

## 2021-12-17 NOTE — PROGRESS NOTES
ORTHOPAEDIC SURGERY FOLLOWUP VISIT    CHIEF COMPLAINT:  Right wrist surgery    DATE OF INJURY: 12/3/2021  DIAGNOSIS: Right distal radius fracture  DATE OF SURGERY: 12/9/2021, ORIF right distal radius fracture    HISTORY OF PRESENT ILLNESS:  18-year-old right-hand-dominant female presents for evaluation of her right wrist. She underwent ORIF of a displaced distal radius fracture on 12/9/2021. It is now postoperative day #8. Overall she is doing very well. She has had a tremendous improvement of her pain. Her pain level is a 2 out of 10. She denies dysesthesias. She has not had any fevers, chills, or night sweats. She notes moderate swelling. Her pain is worse with activity and movements of her wrist. She has been moving her fingers without difficulty. PHYSICAL EXAM:  General: Well-appearing female of stated age. No acute distress. Right upper extremity:  Presents in well-padded short arm volar resting splint. This was removed in order to examine the skin. A longitudinal incision over the volar wrist is well approximated with nylon sutures. No signs of dehiscence. No open areas. No active drainage. There is minor bloody drainage on gauze dressing. No surrounding erythema. No gross purulence. Sensation is intact to light touch in median, radial, and ulnar distributions. Motor function is intact AIN, PIN, ulnar motor nerves. There is brisk capillary refill to the fingers. There is a strong palpable radial pulse. Wrist range of motion was not assessed today. Can wiggle her fingers without difficulty. RADIOGRAPHIC EXAM:  4 views of the right wrist including PA, oblique, lateral and 20 degree inclined x-rays demonstrate appropriate reduction of distal radius fracture with volar locking plate in good position. There is no evidence of hardware complication. Overall restoration of radial length, volar tilt, and radial inclination is appropriate.     ASSESSMENT AND PLAN:  POD #8 status post ORIF right distal radius fracture    Nonweightbearing right upper extremity  Transitioned to a removable wrist brace today. She will wear this full-time with the exception of hygiene purposes and gentle range of motion exercises. May shower but not soak or submerge her incision sites. She will return to clinic on Tuesday, 12/28/2021 for suture removal with Dr. Russell Bonilla. Steri-Strips will be applied at that time. No x-rays necessary at that visit unless adverse changes occur. She will follow up with me 4 weeks from now for assessment of range of motion and repeat x-rays.     Ysabel Steele MD

## 2021-12-28 ENCOUNTER — OFFICE VISIT (OUTPATIENT)
Dept: ORTHOPEDIC SURGERY | Age: 51
End: 2021-12-28

## 2021-12-28 VITALS — WEIGHT: 135 LBS | HEIGHT: 61 IN | BODY MASS INDEX: 25.49 KG/M2

## 2021-12-28 DIAGNOSIS — S52.531D CLOSED COLLES' FRACTURE OF RIGHT RADIUS WITH ROUTINE HEALING, SUBSEQUENT ENCOUNTER: Primary | ICD-10-CM

## 2021-12-28 DIAGNOSIS — Z87.81 S/P ORIF (OPEN REDUCTION INTERNAL FIXATION) FRACTURE: ICD-10-CM

## 2021-12-28 DIAGNOSIS — Z98.890 S/P ORIF (OPEN REDUCTION INTERNAL FIXATION) FRACTURE: ICD-10-CM

## 2021-12-28 PROBLEM — S52.531A FRACTURE, COLLES, RIGHT, CLOSED: Status: ACTIVE | Noted: 2021-12-03

## 2021-12-28 PROCEDURE — 99024 POSTOP FOLLOW-UP VISIT: CPT | Performed by: ORTHOPAEDIC SURGERY

## 2022-01-12 ENCOUNTER — TELEPHONE (OUTPATIENT)
Dept: ORTHOPEDIC SURGERY | Age: 52
End: 2022-01-12

## 2022-01-14 ENCOUNTER — OFFICE VISIT (OUTPATIENT)
Dept: ORTHOPEDIC SURGERY | Age: 52
End: 2022-01-14

## 2022-01-14 VITALS — HEIGHT: 61 IN | WEIGHT: 135 LBS | BODY MASS INDEX: 25.49 KG/M2

## 2022-01-14 DIAGNOSIS — Z87.81 S/P ORIF (OPEN REDUCTION INTERNAL FIXATION) FRACTURE: Primary | ICD-10-CM

## 2022-01-14 DIAGNOSIS — Z98.890 S/P ORIF (OPEN REDUCTION INTERNAL FIXATION) FRACTURE: Primary | ICD-10-CM

## 2022-01-14 PROCEDURE — 99024 POSTOP FOLLOW-UP VISIT: CPT | Performed by: ORTHOPAEDIC SURGERY

## 2022-01-14 NOTE — PROGRESS NOTES
ORTHOPAEDIC SURGERY FOLLOWUP VISIT     CHIEF COMPLAINT:  Right wrist surgery     DATE OF INJURY: 12/3/2021  DIAGNOSIS: Right distal radius fracture  DATE OF SURGERY: 12/9/2021, ORIF right distal radius fracture     HISTORY OF PRESENT ILLNESS:  17-year-old right-hand-dominant female presents for evaluation of her right wrist. She underwent ORIF of a displaced distal radius fracture on 12/9/2021. Has been 5 weeks since her surgery. Overall she is doing very well. She rates her pain a 4 out of 10. She feels as though it is improving. She has not done much in the way of her wrist range of motion. She indicates that she did not believe she was supposed to do this. She has been using a removable brace. She has been struggling with significant dry skin. She denies dysesthesias. She has not had any fevers, chills, or night sweats. She notes moderate swelling.     PHYSICAL EXAM:  General: Well-appearing female of stated age. No acute distress. Right upper extremity:  Presents in a removable wrist brace. This was removed in order to examine the skin. A longitudinal incision over the volar wrist is well-healed. No signs of dehiscence. No open areas. No active drainage. No surrounding erythema. No gross purulence. There is significant dry skin diffusely about the hand. There is moderate digital swelling. Sensation is intact to light touch in median, radial, and ulnar distributions. Motor function is intact AIN, PIN, ulnar motor nerves. There is brisk capillary refill to the fingers. There is a strong palpable radial pulse. Wrist range of motion is 20 degrees extension and 45 degrees flexion. This is significant reduced compared to contralateral wrist.  There is minor limitation to pronation/supination. There is no pain with radial/ulnar deviation. No crepitus or mechanical symptoms. There is minor tenderness to palpation about the ulnar styloid.   She is able to make a 70% fist.  There is significant tightness over the dorsal hand with attempts at a complete fist.  There is pain reproduced in this area. RADIOGRAPHIC EXAM:  4 views of the right wrist including PA, oblique, lateral and 20 degree inclined x-rays demonstrate appropriate reduction of distal radius fracture with volar locking plate in good position. There is no evidence of hardware complication. Overall restoration of radial length, volar tilt, and radial inclination is appropriate. There is interval healing primarily noted about the radial styloid and dorsal wrist.  No evidence of change in alignment.     ASSESSMENT AND PLAN:  5 weeks status post ORIF right distal radius fracture     Patient has significant finger and wrist stiffness at this time. She has digital swelling and significant dry skin. I recommended occupational therapy. She was given a referral.  She will perform this at [facility]. I encouraged her to be aggressive about her finger and wrist range of motion. I reviewed her x-rays with her today. This demonstrates appropriate healing. Her biggest limiting factor will be working through her stiffness at this point. I would allow her to advance to a 10 pound lifting restriction. She will wear the brace only for sleep and when lifting. Otherwise, she can wean from the brace. I did fill out her work form indicating that she will return to work on February 6, 2022 light duty with a 10 pound lifting restriction.   I will see her back in 1 month for repeat assessment of her range of motion and x-rays at that time.     Shelley Brian MD

## 2022-01-20 ENCOUNTER — TELEPHONE (OUTPATIENT)
Dept: ORTHOPEDIC SURGERY | Age: 52
End: 2022-01-20

## 2022-01-20 NOTE — TELEPHONE ENCOUNTER
Faxed completed questionnaire to [facility]) @ [phone]    Claim # [id number]

## 2022-01-25 ENCOUNTER — TELEPHONE (OUTPATIENT)
Dept: ORTHOPEDIC SURGERY | Age: 52
End: 2022-01-25

## 2022-02-15 ENCOUNTER — TELEPHONE (OUTPATIENT)
Dept: ORTHOPEDIC SURGERY | Age: 52
End: 2022-02-15

## 2022-02-15 NOTE — TELEPHONE ENCOUNTER
Faxed updated aps to [facility] @ [phone]    Claim # [id number]

## 2022-03-04 ENCOUNTER — OFFICE VISIT (OUTPATIENT)
Dept: ORTHOPEDIC SURGERY | Age: 52
End: 2022-03-04

## 2022-03-04 DIAGNOSIS — Z87.81 S/P ORIF (OPEN REDUCTION INTERNAL FIXATION) FRACTURE: Primary | ICD-10-CM

## 2022-03-04 DIAGNOSIS — Z98.890 S/P ORIF (OPEN REDUCTION INTERNAL FIXATION) FRACTURE: Primary | ICD-10-CM

## 2022-03-04 PROCEDURE — 99024 POSTOP FOLLOW-UP VISIT: CPT | Performed by: ORTHOPAEDIC SURGERY

## 2022-03-04 NOTE — PROGRESS NOTES
ORTHOPAEDIC SURGERY FOLLOWUP VISIT     CHIEF COMPLAINT:  Right wrist surgery     DATE OF INJURY: 12/3/2021  DIAGNOSIS: Right distal radius fracture  DATE OF SURGERY: 12/9/2021, ORIF right distal radius fracture     HISTORY OF PRESENT ILLNESS:  24-year-old right-hand-dominant female presents for evaluation of her right wrist. She underwent ORIF of a displaced distal radius fracture on 12/9/2021. Has been 3 months since her surgery. Overall she is doing very well. She rates her pain a 0 out of 10. She has undergone a course of therapy and feels that her wrist has returned to almost normal.  She has noticed incremental improvement. She does not feel limited by her wrist at this point. She denies mechanical symptoms of catching or locking. She denies pain with thumb movements. She denies tenderness about the wrist.  Overall, she is happy with her result and feels ready to return to work at this time without restrictions.     PHYSICAL EXAM:  General: Well-appearing female of stated age. No acute distress. Right upper extremity:  A longitudinal incision over the volar wrist is well-healed. No signs of dehiscence. No open areas. No active drainage. No surrounding erythema. No gross purulence. Digital swelling is vastly improved from previous evaluation. 45 Sensation is intact to light touch in median, radial, and ulnar distributions. Motor function is intact AIN, PIN, ulnar motor nerves. There is brisk capillary refill to the fingers. There is a strong palpable radial pulse. Wrist range of motion is 20 degrees extension and 70 degrees flexion. This is similar to the contralateral wrist. There is no significant limitation to pronation/supination. There is 90 degrees of each. She can perform wrist circumduction without any difficulties. There is no pain with radial/ulnar deviation. No crepitus or mechanical symptoms.   She is able to make 100% fist.     RADIOGRAPHIC EXAM:  4 views of the right wrist including PA, oblique, lateral and 20 degree inclined x-rays demonstrate appropriate reduction of distal radius fracture with volar locking plate in good position. There is no evidence of hardware complication. Overall restoration of radial length, volar tilt, and radial inclination is appropriate. There is mature healing about the radial styloid. There is scattered dystrophic calcifications about the wrist. No evidence of change in alignment.     ASSESSMENT AND PLAN:  3 months status post ORIF right distal radius fracture    Doing very well. Progressing as expected. I would allow her to return to work without restrictions at this point. She does not need further immobilization. She was advised to expect some soreness and intermittent tendinitis that she resumes full activities. She will manage this with over-the-counter anti-inflammatories and ice. I informed her that her wrist fracture is maturely healed at this point. I counseled her on the possibility of tendon irritation and the need for hardware removal in the future. Expect that her plate will not need to be removed. She was provided with a work note indicating she could return to work without restriction.   I would see her back on an as-needed basis in the future.     Joe Willis MD

## (undated) DEVICE — GAUZE,SPONGE,4"X4",8PLY,STRL,LF,10/TRAY: Brand: MEDLINE

## (undated) DEVICE — NEEDLE HYPO 25GA L1.5IN BLU POLYPR HUB S STL REG BVL STR

## (undated) DEVICE — DRAPE C ARM UNIV W41XL74IN CLR PLAS XR VELC CLSR POLY STRP

## (undated) DEVICE — PERI-LOC K-WIRE 1.6MM X 150MM                                    LENGTH TROCAR POINT
Type: IMPLANTABLE DEVICE | Site: ARM | Status: NON-FUNCTIONAL
Brand: PERI-LOC
Removed: 2021-12-09

## (undated) DEVICE — ELECTRODE PT RET AD L9FT HI MOIST COND ADH HYDRGEL CORDED

## (undated) DEVICE — SUTURE ETHLN SZ 3-0 L18IN NONABSORBABLE BLK PS-2 L19MM 3/8 1669H

## (undated) DEVICE — TROCAR TIP WIRE 1.4MM X 100MM
Type: IMPLANTABLE DEVICE | Site: ARM | Status: NON-FUNCTIONAL
Brand: D-RAD
Removed: 2021-12-09

## (undated) DEVICE — STOCKINETTE,IMPERVIOUS,12X48,STERILE: Brand: MEDLINE

## (undated) DEVICE — PACK ORTH LO EXT VI

## (undated) DEVICE — GOWN SIRUS NONREIN XL W/TWL: Brand: MEDLINE INDUSTRIES, INC.

## (undated) DEVICE — COTTON UNDERCAST PADDING,CRIMPED FINISH: Brand: WEBRIL

## (undated) DEVICE — SUTURE MCRYL SZ 2-0 L18IN ABSRB VLT L36MM CT-1 1/2 CIR Y739D

## (undated) DEVICE — SLING ARM M L1375IN D75IN WHT POLY MESH ENVELOP MTL SIDE

## (undated) DEVICE — BANDAGE COMPR W4INXL12FT E DISP ESMARCH EVEN

## (undated) DEVICE — DRESSING,GAUZE,XEROFORM,CURAD,1"X8",ST: Brand: CURAD

## (undated) DEVICE — PADDING UNDERCAST W4INXL4YD 100% COT CRIMPED FINISH WBRL II

## (undated) DEVICE — SUTURE MCRYL + SZ 4-0 L18IN ABSRB UD L19MM PS-2 3/8 CIR MCP496G

## (undated) DEVICE — SUTURE VCRL SZ 3-0 L18IN ABSRB UD L26MM SH 1/2 CIR J864D

## (undated) DEVICE — GLOVE ORANGE PI 7 1/2   MSG9075

## (undated) DEVICE — SYRINGE MED 10ML LUERLOCK TIP W/O SFTY DISP

## (undated) DEVICE — VLP TITANIUM 2.4MM X 22MM LOCKING                                    SCREW T7 SELF-TAPPING
Type: IMPLANTABLE DEVICE | Site: RADIUS | Status: NON-FUNCTIONAL
Brand: VLP MINI-MOD
Removed: 2021-12-09

## (undated) DEVICE — MAJOR SET UP PK

## (undated) DEVICE — EVOS DISTAL RADIUS 1.8MM DRILL AO QC: Brand: EVOS

## (undated) DEVICE — APPLICATOR PREP 26ML 0.7% IOD POVACRYLEX 74% ISO ALC ST

## (undated) DEVICE — SOLUTION IV IRRIG 500ML 0.9% SODIUM CHL 2F7123

## (undated) DEVICE — VLP TITANIUM 2.4MM X 18MM LOCKING                                    SCREW T7 SELF-TAPPING
Type: IMPLANTABLE DEVICE | Site: RADIUS | Status: NON-FUNCTIONAL
Brand: VLP MINI-MOD
Removed: 2021-12-09

## (undated) DEVICE — 3M™ COBAN™ NL STERILE NON-LATEX SELF-ADHERENT WRAP, 2084S, 4 IN X 5 YD (10 CM X 4,5 M), 18 ROLLS/CASE: Brand: 3M™ COBAN™

## (undated) DEVICE — SUTURE VCRL + SZ 3-0 L27IN ABSRB UD L26MM SH 1/2 CIR VCP416H

## (undated) DEVICE — TIP SUCT DIA12FR W STYL CTRL VENT DISPOSABLE FRAZ